# Patient Record
(demographics unavailable — no encounter records)

---

## 2024-10-13 NOTE — ASSESSMENT
[FreeTextEntry1] : 57 year old male, with a history of cardiac arrest status post pacemaker/defibrillator placement, has no significant history of Urologic Malignancies.   Currently, he is complaining of bothersome lower urinary tract symptoms mostly 3 episodes of nocturia.   He reports decreasing p.o. fluids prior to bedtime however, still having nocturia.   Throughout the day he is voiding well with some degree of frequency.  PVR 82 ml  PSA history: PSA 10/2024        2.11 ng/ml PSA in 2023 was 1.82 ng/ml PSA in 2022 was 1.28 ng/mL PSA in 2021 was 1.73 ng/mL  PSA in 2020 was 1.4 ng/mL PSA in 2019 was 1.64 ng/mL  Kidney Bladder US 10/10/2021 -Prostatomegaly 50 cc -Nonobstructing right renal stone 4mm -Left 1.5 cm upper pole parenchymal simple cyst.  -PVR 15 mL   Plan  - continue Tamsulosin p.o. daily for his bothersome lower urinary tract symptoms.   - Renal and BLadder US - PSA reviewed - f/u in 2 weeks to review US

## 2024-10-13 NOTE — LETTER BODY
[Dear  ___] : Dear  [unfilled], [Consult Letter:] : I had the pleasure of evaluating your patient, [unfilled]. [Please see my note below.] : Please see my note below. [Consult Closing:] : Thank you very much for allowing me to participate in the care of this patient.  If you have any questions, please do not hesitate to contact me. [Sincerely,] : Sincerely, [FreeTextEntry3] : Luisa Goel MD, FACS\par

## 2024-10-13 NOTE — HISTORY OF PRESENT ILLNESS
[FreeTextEntry1] : 57 year old male, with a history of cardiac arrest status post pacemaker/defibrillator placement, has no significant history of Urologic Malignancies.   Currently, he is complaining of bothersome lower urinary tract symptoms mostly 3 episodes of nocturia.   He reports decreasing p.o. fluids prior to bedtime however, still having nocturia.   Throughout the day he is voiding well with some degree of frequency.  PVR 82 ml  PSA history: PSA 10/2024        2.11 ng/ml PSA in 2023 was 1.82 ng/ml PSA in 2022 was 1.28 ng/mL PSA in 2021 was 1.73 ng/mL  PSA in 2020 was 1.4 ng/mL PSA in 2019 was 1.64 ng/mL  Kidney Bladder US 10/10/2021 -Prostatomegaly 50 cc -Nonobstructing right renal stone 4mm -Left 1.5 cm upper pole parenchymal simple cyst.  -PVR 15 mL [None] : no symptoms

## 2025-05-01 NOTE — PHYSICAL EXAM
Detail Level: Detailed Add 15343 Cpt? (Important Note: In 2017 The Use Of 45096 Is Being Tracked By Cms To Determine Future Global Period Reimbursement For Global Periods): yes [General Appearance - In No Acute Distress] : no acute distress [] : no respiratory distress [Normal Station and Gait] : the gait and station were normal for the patient's age [No Focal Deficits] : no focal deficits [Oriented To Time, Place, And Person] : oriented to person, place, and time

## 2025-05-05 NOTE — ASSESSMENT
[FreeTextEntry1] : 57-year-old with BPH and lower urinary tract symptoms.  He is now on finasteride 5 mg daily.  No longer taking tamsulosin.  Due for repeat PSA.  Was unable to draw in office today and patient will go to lab.  Provided stable repeat in 6 months and follow-up to review.  We did review medication options today as he is having low libido on finasteride.  Discussed options of deseeding finasteride.  We discussed options such as tadalafil 5 mg daily.  At present point in time patient elects to continue with finasteride 5 mg daily and have no change in management.  He will discuss with his wife and cardiologist about daily Cialis and will call if he were requested prescription.

## 2025-05-05 NOTE — LETTER BODY
[Dear  ___] : Dear  [unfilled], [Consult Letter:] : I had the pleasure of evaluating your patient, [unfilled]. [Please see my note below.] : Please see my note below. [Sincerely,] : Sincerely, [FreeTextEntry3] : Luisa Goel MD, FACS

## 2025-05-05 NOTE — ADDENDUM
[FreeTextEntry1] : Documented by JOSEPH Strickland acting as a scribe for Dr. Luisa Goel   All medical record entries made by the Scribe were at my, Dr. Goel direction and personally dictated by me.  I have reviewed the chart and agree that the record accurately reflects my personal performance of the history, physical exam, procedure and imaging.

## 2025-05-05 NOTE — HISTORY OF PRESENT ILLNESS
[FreeTextEntry1] : Bruno is a 57-year-old with history of cardiac arrest status post pacemaker and defibrillator in 2022.  Followed by urology for bothersome lower urinary tract symptoms and BPH. Currently maintained on finasteride 5 mg daily. Reports improvement in urination.  Reports side effect of low libido.  Prior: PSA history: PSA 10/2024 2.11 ng/ml PSA in 2023 was 1.82 ng/ml PSA in 2022 was 1.28 ng/mL PSA in 2021 was 1.73 ng/mL PSA in 2020 was 1.4 ng/mL PSA in 2019 was 1.64 ng/mL  Kidney Bladder US 10/2024 IMPRESSION: 1. No hydronephrosis. 2. Bilateral nephrolithiasis as above. 3. Enlarged prostate gland volume of 73 mL. 4. Normal sonographic appearance of the bladder. Postvoid residual 119 mL.

## 2025-06-04 NOTE — ASSESSMENT
[FreeTextEntry1] : 57-year-old with history of cardiac arrest status post pacemaker and defibrillator in 2022.  Followed by urology for bothersome lower urinary tract symptoms and BPH. Currently maintained on finasteride 5 mg daily. Reports improvement in urination.  Reports side effect of low libido.  Prior: PSA history: PSA 05/2025 4.37 ng/ml / 8% free PSA 10/2024 2.11 ng/ml PSA in 2023 was 1.82 ng/ml PSA in 2022 was 1.28 ng/mL PSA in 2021 was 1.73 ng/mL PSA in 2020 was 1.4 ng/mL PSA in 2019 was 1.64 ng/mL  Kidney Bladder US 10/2024 IMPRESSION: 1. No hydronephrosis. 2. Bilateral nephrolithiasis as above. 3. Enlarged prostate gland volume of 73 mL. 4. Normal sonographic appearance of the bladder. Postvoid residual 119 mL.  he is scheduled for an MRI of the prostate cleared by interventional cardiology  Plan  57-year-old with BPH and lower urinary tract symptoms.   on finasteride 5 mg daily  reviewed the relevance of his present PSA levels and discussed the need for further assessment to determine need for biopsy all questions answered PSA levels reviewed - f/u with MRI

## 2025-06-04 NOTE — HISTORY OF PRESENT ILLNESS
[FreeTextEntry1] : 57-year-old with history of cardiac arrest status post pacemaker and defibrillator in 2022.  Followed by urology for bothersome lower urinary tract symptoms and BPH. Currently maintained on finasteride 5 mg daily. Reports improvement in urination.  Reports side effect of low libido.  Prior: PSA history: PSA 05/2025 4.37 ng/ml / 8% free PSA 10/2024 2.11 ng/ml PSA in 2023 was 1.82 ng/ml PSA in 2022 was 1.28 ng/mL PSA in 2021 was 1.73 ng/mL PSA in 2020 was 1.4 ng/mL PSA in 2019 was 1.64 ng/mL  Kidney Bladder US 10/2024 IMPRESSION: 1. No hydronephrosis. 2. Bilateral nephrolithiasis as above. 3. Enlarged prostate gland volume of 73 mL. 4. Normal sonographic appearance of the bladder. Postvoid residual 119 mL.  he is scheduled for an MRI of the prostate cleared by interventional cardiology [None] : no symptoms